# Patient Record
Sex: FEMALE
[De-identification: names, ages, dates, MRNs, and addresses within clinical notes are randomized per-mention and may not be internally consistent; named-entity substitution may affect disease eponyms.]

---

## 2019-09-03 ENCOUNTER — APPOINTMENT (OUTPATIENT)
Dept: HEART AND VASCULAR | Facility: CLINIC | Age: 69
End: 2019-09-03
Payer: MEDICARE

## 2019-09-03 VITALS
RESPIRATION RATE: 12 BRPM | DIASTOLIC BLOOD PRESSURE: 80 MMHG | HEART RATE: 70 BPM | BODY MASS INDEX: 20.24 KG/M2 | SYSTOLIC BLOOD PRESSURE: 130 MMHG | WEIGHT: 110 LBS | HEIGHT: 62 IN

## 2019-09-03 DIAGNOSIS — Z80.3 FAMILY HISTORY OF MALIGNANT NEOPLASM OF BREAST: ICD-10-CM

## 2019-09-03 DIAGNOSIS — F17.200 NICOTINE DEPENDENCE, UNSPECIFIED, UNCOMPLICATED: ICD-10-CM

## 2019-09-03 DIAGNOSIS — Z80.42 FAMILY HISTORY OF MALIGNANT NEOPLASM OF PROSTATE: ICD-10-CM

## 2019-09-03 DIAGNOSIS — R00.2 PALPITATIONS: ICD-10-CM

## 2019-09-03 DIAGNOSIS — Z86.59 PERSONAL HISTORY OF OTHER MENTAL AND BEHAVIORAL DISORDERS: ICD-10-CM

## 2019-09-03 PROBLEM — Z00.00 ENCOUNTER FOR PREVENTIVE HEALTH EXAMINATION: Status: ACTIVE | Noted: 2019-09-03

## 2019-09-03 PROCEDURE — 99204 OFFICE O/P NEW MOD 45 MIN: CPT

## 2019-09-03 PROCEDURE — 93000 ELECTROCARDIOGRAM COMPLETE: CPT

## 2019-09-03 RX ORDER — ALBUTEROL SULFATE 2.5 MG/3ML
(2.5 MG/3ML) VIAL, NEBULIZER (ML) INHALATION
Refills: 0 | Status: ACTIVE | COMMUNITY

## 2019-09-03 RX ORDER — CLONAZEPAM 0.5 MG/1
0.5 TABLET ORAL
Refills: 0 | Status: ACTIVE | COMMUNITY

## 2019-09-03 NOTE — DISCUSSION/SUMMARY
[FreeTextEntry1] : 1. Chest pain: Will need to rule out ischemic heart disease in this postmenopausal female cigarette smoker with new onset chest pain shortness of breath on exertion. Will advise once results an exercise nuclear stress test with known.\par 2. Shortness of breath: Echocardiogram\par 3. Palpitations: Will place a 24 hour Holter monitor to assess for arrhythmias and advise once results known.\par 4.carotid bruit: carotid duplex

## 2019-09-03 NOTE — HISTORY OF PRESENT ILLNESS
[FreeTextEntry1] : 69-year-old female with a past medical history of anxiety cigarette smoking comes in for evaluation of chest pain shortness of breath and palpitations. Patient reports for nearly a year exertional substernal dull chest discomfort nonradiating associated with shortness of breath relieved with rest. Patient also reports over the last few weeks feeling of palpitations specifically with food lightheadedness diaphoresis. No syncope. EKG done by PCP one month ago revealed normal sinus rhythm nonspecific ST-T wave abnormalities blood work reviewed.

## 2019-09-03 NOTE — REASON FOR VISIT
[Initial Evaluation] : an initial evaluation of [Chest Pain] : chest pain [Dyspnea] : dyspnea [Palpitations] : palpitations

## 2019-09-03 NOTE — PHYSICAL EXAM
[General Appearance - Well Developed] : well developed [Normal Appearance] : normal appearance [Well Groomed] : well groomed [General Appearance - Well Nourished] : well nourished [No Deformities] : no deformities [General Appearance - In No Acute Distress] : no acute distress [Normal Oral Mucosa] : normal oral mucosa [No Oral Pallor] : no oral pallor [No Oral Cyanosis] : no oral cyanosis [Normal Jugular Venous V Waves Present] : normal jugular venous V waves present [Normal Jugular Venous A Waves Present] : normal jugular venous A waves present [No Jugular Venous Herrera A Waves] : no jugular venous herrera A waves [5th Left ICS - MCL] : palpated at the 5th LICS in the midclavicular line [Normal] : normal [Normal Rate] : normal [Rhythm Regular] : regular [Normal S1] : normal S1 [Normal S2] : normal S2 [III] : a grade 3 [Right Carotid Bruit] : right carotid bruit heard [Left Carotid Bruit] : left carotid bruit heard [No Pitting Edema] : no pitting edema present [Respiration, Rhythm And Depth] : normal respiratory rhythm and effort [Exaggerated Use Of Accessory Muscles For Inspiration] : no accessory muscle use [Auscultation Breath Sounds / Voice Sounds] : lungs were clear to auscultation bilaterally [Abdomen Soft] : soft [Abdomen Tenderness] : non-tender [Abdomen Mass (___ Cm)] : no abdominal mass palpated [Abnormal Walk] : normal gait [Nail Clubbing] : no clubbing of the fingernails [Gait - Sufficient For Exercise Testing] : the gait was sufficient for exercise testing [Cyanosis, Localized] : no localized cyanosis [Petechial Hemorrhages (___cm)] : no petechial hemorrhages [] : no ischemic changes [Oriented To Time, Place, And Person] : oriented to person, place, and time [Affect] : the affect was normal [Mood] : the mood was normal [No Anxiety] : not feeling anxious

## 2019-09-11 ENCOUNTER — APPOINTMENT (OUTPATIENT)
Dept: HEART AND VASCULAR | Facility: CLINIC | Age: 69
End: 2019-09-11
Payer: MEDICARE

## 2019-09-11 VITALS
BODY MASS INDEX: 20.61 KG/M2 | WEIGHT: 112 LBS | DIASTOLIC BLOOD PRESSURE: 70 MMHG | HEART RATE: 71 BPM | HEIGHT: 62 IN | SYSTOLIC BLOOD PRESSURE: 130 MMHG

## 2019-09-11 DIAGNOSIS — R07.9 CHEST PAIN, UNSPECIFIED: ICD-10-CM

## 2019-09-11 DIAGNOSIS — R06.02 SHORTNESS OF BREATH: ICD-10-CM

## 2019-09-11 DIAGNOSIS — R09.89 OTHER SPECIFIED SYMPTOMS AND SIGNS INVOLVING THE CIRCULATORY AND RESPIRATORY SYSTEMS: ICD-10-CM

## 2019-09-11 PROCEDURE — A9500: CPT

## 2019-09-11 PROCEDURE — 93306 TTE W/DOPPLER COMPLETE: CPT

## 2019-09-11 PROCEDURE — 93880 EXTRACRANIAL BILAT STUDY: CPT

## 2019-09-11 PROCEDURE — 78452 HT MUSCLE IMAGE SPECT MULT: CPT

## 2019-09-11 PROCEDURE — 96374 THER/PROPH/DIAG INJ IV PUSH: CPT | Mod: 59

## 2019-09-11 PROCEDURE — 93015 CV STRESS TEST SUPVJ I&R: CPT

## 2019-09-11 NOTE — PHYSICAL EXAM
[General Appearance - Well Developed] : well developed [Well Groomed] : well groomed [Normal Appearance] : normal appearance [No Deformities] : no deformities [General Appearance - Well Nourished] : well nourished [General Appearance - In No Acute Distress] : no acute distress [Eyelids - No Xanthelasma] : the eyelids demonstrated no xanthelasmas [Normal Conjunctiva] : the conjunctiva exhibited no abnormalities [Normal Oral Mucosa] : normal oral mucosa [No Oral Cyanosis] : no oral cyanosis [No Oral Pallor] : no oral pallor [Normal Jugular Venous A Waves Present] : normal jugular venous A waves present [Normal Jugular Venous V Waves Present] : normal jugular venous V waves present [No Jugular Venous Herrera A Waves] : no jugular venous herrera A waves [] : no respiratory distress [Respiration, Rhythm And Depth] : normal respiratory rhythm and effort [Exaggerated Use Of Accessory Muscles For Inspiration] : no accessory muscle use [Auscultation Breath Sounds / Voice Sounds] : lungs were clear to auscultation bilaterally [Heart Rate And Rhythm] : heart rate and rhythm were normal [Heart Sounds] : normal S1 and S2 [Murmurs] : no murmurs present

## 2019-10-15 ENCOUNTER — APPOINTMENT (OUTPATIENT)
Dept: HEART AND VASCULAR | Facility: CLINIC | Age: 69
End: 2019-10-15

## 2020-09-07 ENCOUNTER — TRANSCRIPTION ENCOUNTER (OUTPATIENT)
Age: 70
End: 2020-09-07